# Patient Record
Sex: MALE | Race: WHITE | HISPANIC OR LATINO | URBAN - METROPOLITAN AREA
[De-identification: names, ages, dates, MRNs, and addresses within clinical notes are randomized per-mention and may not be internally consistent; named-entity substitution may affect disease eponyms.]

---

## 2017-01-06 ENCOUNTER — EMERGENCY (EMERGENCY)
Facility: HOSPITAL | Age: 42
LOS: 1 days | Discharge: HOME | End: 2017-01-06
Attending: EMERGENCY MEDICINE | Admitting: EMERGENCY MEDICINE
Payer: COMMERCIAL

## 2017-01-06 VITALS
WEIGHT: 199.96 LBS | SYSTOLIC BLOOD PRESSURE: 94 MMHG | OXYGEN SATURATION: 97 % | HEART RATE: 63 BPM | HEIGHT: 72 IN | TEMPERATURE: 98 F | RESPIRATION RATE: 20 BRPM | DIASTOLIC BLOOD PRESSURE: 64 MMHG
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
## 2017-01-06 DIAGNOSIS — T31.0 BURNS INVOLVING LESS THAN 10% OF BODY SURFACE: ICD-10-CM
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
## 2017-01-06 DIAGNOSIS — T22.211A BURN OF SECOND DEGREE OF RIGHT FOREARM, INITIAL ENCOUNTER: ICD-10-CM

## 2017-01-06 PROCEDURE — 99282 EMERGENCY DEPT VISIT SF MDM: CPT

## 2017-01-06 NOTE — ED STATDOCS - NS ED MD SCRIBE ATTENDING SCRIBE SECTIONS
PAST MEDICAL/SURGICAL/SOCIAL HISTORY/REVIEW OF SYSTEMS/HIV/PHYSICAL EXAM/VITAL SIGNS( Pullset)/DISPOSITION/HISTORY OF PRESENT ILLNESS

## 2017-01-06 NOTE — ED STATDOCS - OBJECTIVE STATEMENT
42 y/o male presents to ED c/o burn on the right forearm onset 4 days s/p trauma at work. 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 Pt has been putting aloe vera on the burn. Last tetanus shot was 3-4 years ago. Pt taking no medication for pain. No further complaints at this time.

## 2017-01-06 NOTE — ED STATDOCS - SKIN, MLM
1st and 3nd degree burns of right forearm. Not circumferential, not involving fingers. Some blisters ruptured; healing well. Few intact blisters; small with no evidence of infection.

## 2017-01-06 NOTE — ED STATDOCS - DETAILS:
I, Rowan Mcmillan, personally performed the services described in the documentation, reviewed the documentation recorded by the scribe in my presence and it accurately and completely records my words and action.

## 2023-11-29 ENCOUNTER — RX ONLY (RX ONLY)
Age: 48
End: 2023-11-29

## 2023-11-29 ENCOUNTER — OFFICE (OUTPATIENT)
Dept: URBAN - METROPOLITAN AREA CLINIC 116 | Facility: CLINIC | Age: 48
Setting detail: OPHTHALMOLOGY
End: 2023-11-29
Payer: COMMERCIAL

## 2023-11-29 DIAGNOSIS — H40.003: ICD-10-CM

## 2023-11-29 PROBLEM — H52.4 PRESBYOPIA: Status: ACTIVE | Noted: 2023-11-29

## 2023-11-29 PROCEDURE — 92004 COMPRE OPH EXAM NEW PT 1/>: CPT | Performed by: OPTOMETRIST

## 2023-11-29 PROCEDURE — 92250 FUNDUS PHOTOGRAPHY W/I&R: CPT | Performed by: OPTOMETRIST

## 2023-11-29 ASSESSMENT — REFRACTION_AUTOREFRACTION
OS_AXIS: 120
OS_SPHERE: +0.75
OS_CYLINDER: -0.25
OD_AXIS: 140
OD_CYLINDER: -0.25
OD_SPHERE: +0.75

## 2023-11-29 ASSESSMENT — REFRACTION_MANIFEST
OD_CYLINDER: SPH
OS_ADD: +1.50
OD_ADD: +1.50
OD_SPHERE: +0.25
OS_CYLINDER: SPH
OD_VA1: 20/20
OS_SPHERE: +0.25
OS_VA1: 20/20

## 2023-11-29 ASSESSMENT — REFRACTION_CURRENTRX
OS_ADD: +0.75
OD_CYLINDER: -0.25
OD_AXIS: 040
OD_OVR_VA: 20/
OD_SPHERE: +0.50
OD_OVR_VA: 20/
OS_OVR_VA: 20/
OD_ADD: +1.00
OS_OVR_VA: 20/
OD_SPHERE: +2.00
OS_CYLINDER: -0.25
OS_AXIS: 095
OS_SPHERE: +0.50
OS_SPHERE: +2.00

## 2023-11-29 ASSESSMENT — SPHEQUIV_DERIVED
OD_SPHEQUIV: 0.625
OS_SPHEQUIV: 0.625

## 2023-11-29 ASSESSMENT — CONFRONTATIONAL VISUAL FIELD TEST (CVF)
OS_FINDINGS: FULL
OD_FINDINGS: FULL

## 2023-12-05 ENCOUNTER — OFFICE (OUTPATIENT)
Dept: URBAN - METROPOLITAN AREA CLINIC 94 | Facility: CLINIC | Age: 48
Setting detail: OPHTHALMOLOGY
End: 2023-12-05
Payer: COMMERCIAL

## 2023-12-05 DIAGNOSIS — H40.1121: ICD-10-CM

## 2023-12-05 DIAGNOSIS — H40.1111: ICD-10-CM

## 2023-12-05 PROCEDURE — 92012 INTRM OPH EXAM EST PATIENT: CPT | Performed by: OPHTHALMOLOGY

## 2023-12-05 PROCEDURE — 92020 GONIOSCOPY: CPT | Performed by: OPHTHALMOLOGY

## 2023-12-05 PROCEDURE — 76514 ECHO EXAM OF EYE THICKNESS: CPT | Performed by: OPHTHALMOLOGY

## 2023-12-05 PROCEDURE — 92133 CPTRZD OPH DX IMG PST SGM ON: CPT | Performed by: OPHTHALMOLOGY

## 2023-12-05 PROCEDURE — 92083 EXTENDED VISUAL FIELD XM: CPT | Performed by: OPHTHALMOLOGY

## 2023-12-05 ASSESSMENT — REFRACTION_CURRENTRX
OS_ADD: +0.75
OS_OVR_VA: 20/
OD_SPHERE: +0.50
OD_OVR_VA: 20/
OD_CYLINDER: -0.25
OD_AXIS: 040
OS_CYLINDER: -0.25
OS_SPHERE: +0.50
OD_ADD: +1.00
OD_OVR_VA: 20/
OD_SPHERE: +2.00
OS_OVR_VA: 20/
OS_SPHERE: +2.00
OS_AXIS: 095

## 2023-12-05 ASSESSMENT — SPHEQUIV_DERIVED
OS_SPHEQUIV: 0.625
OD_SPHEQUIV: 0.5

## 2023-12-05 ASSESSMENT — REFRACTION_AUTOREFRACTION
OD_AXIS: 000
OS_CYLINDER: -0.25
OS_AXIS: 027
OS_SPHERE: +0.75
OD_SPHERE: +0.50
OD_CYLINDER: 0.00

## 2023-12-05 ASSESSMENT — REFRACTION_MANIFEST
OS_VA1: 20/20
OD_CYLINDER: SPH
OD_VA1: 20/20
OS_CYLINDER: SPH
OD_ADD: +1.50
OS_SPHERE: +0.25
OD_SPHERE: +0.25
OS_ADD: +1.50

## 2023-12-05 ASSESSMENT — CONFRONTATIONAL VISUAL FIELD TEST (CVF)
OS_FINDINGS: FULL
OD_FINDINGS: FULL

## 2023-12-28 ENCOUNTER — ASC (OUTPATIENT)
Dept: URBAN - METROPOLITAN AREA SURGERY 8 | Facility: SURGERY | Age: 48
Setting detail: OPHTHALMOLOGY
End: 2023-12-28
Payer: COMMERCIAL

## 2023-12-28 DIAGNOSIS — H40.1111: ICD-10-CM

## 2023-12-28 PROBLEM — H40.1121 POAG; RIGHT EYE MILD, LEFT EYE MILD: Status: ACTIVE | Noted: 2023-12-05

## 2023-12-28 PROCEDURE — 65855 TRABECULOPLASTY LASER SURG: CPT | Mod: RT | Performed by: OPHTHALMOLOGY

## 2023-12-28 ASSESSMENT — REFRACTION_CURRENTRX
OS_SPHERE: +0.50
OS_ADD: +0.75
OS_SPHERE: +2.00
OS_OVR_VA: 20/
OS_OVR_VA: 20/
OD_SPHERE: +2.00
OD_CYLINDER: -0.25
OD_OVR_VA: 20/
OD_ADD: +1.00
OS_AXIS: 095
OD_AXIS: 040
OS_CYLINDER: -0.25
OD_OVR_VA: 20/
OD_SPHERE: +0.50

## 2023-12-28 ASSESSMENT — REFRACTION_MANIFEST
OS_ADD: +1.50
OD_VA1: 20/20
OS_SPHERE: +0.25
OS_CYLINDER: SPH
OD_CYLINDER: SPH
OD_ADD: +1.50
OD_SPHERE: +0.25
OS_VA1: 20/20

## 2023-12-28 ASSESSMENT — REFRACTION_AUTOREFRACTION
OS_SPHERE: +0.75
OS_AXIS: 027
OD_AXIS: 000
OD_CYLINDER: 0.00
OS_CYLINDER: -0.25
OD_SPHERE: +0.50

## 2023-12-28 ASSESSMENT — SPHEQUIV_DERIVED
OD_SPHEQUIV: 0.5
OS_SPHEQUIV: 0.625

## 2024-02-06 ENCOUNTER — OFFICE (OUTPATIENT)
Dept: URBAN - METROPOLITAN AREA CLINIC 94 | Facility: CLINIC | Age: 49
Setting detail: OPHTHALMOLOGY
End: 2024-02-06
Payer: COMMERCIAL

## 2024-02-06 DIAGNOSIS — H40.1121: ICD-10-CM

## 2024-02-06 PROCEDURE — 65855 TRABECULOPLASTY LASER SURG: CPT | Mod: LT | Performed by: OPHTHALMOLOGY

## 2024-02-06 ASSESSMENT — REFRACTION_MANIFEST
OD_SPHERE: +0.25
OS_VA1: 20/20
OS_ADD: +1.50
OD_ADD: +1.50
OS_SPHERE: +0.25
OS_CYLINDER: SPH
OD_CYLINDER: SPH
OD_VA1: 20/20

## 2024-02-06 ASSESSMENT — REFRACTION_CURRENTRX
OD_ADD: +1.00
OD_CYLINDER: -0.25
OS_CYLINDER: -0.25
OS_OVR_VA: 20/
OD_AXIS: 040
OS_AXIS: 095
OD_OVR_VA: 20/
OS_SPHERE: +2.00
OS_ADD: +0.75
OD_OVR_VA: 20/
OS_SPHERE: +0.50
OD_SPHERE: +2.00
OD_SPHERE: +0.50
OS_OVR_VA: 20/

## 2024-02-06 ASSESSMENT — REFRACTION_AUTOREFRACTION
OS_SPHERE: +0.75
OS_CYLINDER: -0.25
OS_AXIS: 027
OD_SPHERE: +0.50
OD_AXIS: 000
OD_CYLINDER: 0.00

## 2024-02-06 ASSESSMENT — SPHEQUIV_DERIVED
OD_SPHEQUIV: 0.5
OS_SPHEQUIV: 0.625

## 2024-02-28 ENCOUNTER — OFFICE (OUTPATIENT)
Dept: URBAN - METROPOLITAN AREA CLINIC 94 | Facility: CLINIC | Age: 49
Setting detail: OPHTHALMOLOGY
End: 2024-02-28
Payer: COMMERCIAL

## 2024-02-28 DIAGNOSIS — H40.1121: ICD-10-CM

## 2024-02-28 DIAGNOSIS — H40.1111: ICD-10-CM

## 2024-02-28 PROCEDURE — 92012 INTRM OPH EXAM EST PATIENT: CPT | Performed by: OPHTHALMOLOGY

## 2024-02-28 ASSESSMENT — REFRACTION_CURRENTRX
OS_CYLINDER: -0.25
OS_SPHERE: +0.50
OS_AXIS: 095
OS_ADD: +0.75
OD_SPHERE: +0.50
OS_OVR_VA: 20/
OD_AXIS: 040
OD_OVR_VA: 20/
OS_OVR_VA: 20/
OD_SPHERE: +2.00
OD_OVR_VA: 20/
OS_SPHERE: +2.00
OD_CYLINDER: -0.25
OD_ADD: +1.00

## 2024-02-28 ASSESSMENT — REFRACTION_AUTOREFRACTION
OS_SPHERE: +0.75
OD_CYLINDER: 0.00
OS_CYLINDER: -0.25
OD_AXIS: 000
OD_SPHERE: +0.50
OS_AXIS: 142

## 2024-02-28 ASSESSMENT — REFRACTION_MANIFEST
OS_CYLINDER: SPH
OS_ADD: +1.50
OS_VA1: 20/20
OD_VA1: 20/20
OD_ADD: +1.50
OD_CYLINDER: SPH
OD_SPHERE: +0.25
OS_SPHERE: +0.25

## 2024-02-28 ASSESSMENT — CONFRONTATIONAL VISUAL FIELD TEST (CVF)
OS_FINDINGS: FULL
OD_FINDINGS: FULL

## 2024-02-28 ASSESSMENT — SPHEQUIV_DERIVED
OD_SPHEQUIV: 0.5
OS_SPHEQUIV: 0.625

## 2024-03-28 ENCOUNTER — OFFICE (OUTPATIENT)
Dept: URBAN - METROPOLITAN AREA CLINIC 94 | Facility: CLINIC | Age: 49
Setting detail: OPHTHALMOLOGY
End: 2024-03-28
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
## 2024-03-28 PROCEDURE — NO SHOW FE NO SHOW FEE: Performed by: OPHTHALMOLOGY

## 2024-05-15 ENCOUNTER — OFFICE (OUTPATIENT)
Dept: URBAN - METROPOLITAN AREA CLINIC 94 | Facility: CLINIC | Age: 49
Setting detail: OPHTHALMOLOGY
End: 2024-05-15
Payer: COMMERCIAL

## 2024-05-15 DIAGNOSIS — H40.1121: ICD-10-CM

## 2024-05-15 DIAGNOSIS — H40.1111: ICD-10-CM

## 2024-05-15 PROCEDURE — 92012 INTRM OPH EXAM EST PATIENT: CPT | Performed by: OPHTHALMOLOGY

## 2024-05-15 ASSESSMENT — CONFRONTATIONAL VISUAL FIELD TEST (CVF)
OS_FINDINGS: FULL
OD_FINDINGS: FULL

## 2024-09-03 ENCOUNTER — OFFICE (OUTPATIENT)
Dept: URBAN - METROPOLITAN AREA CLINIC 94 | Facility: CLINIC | Age: 49
Setting detail: OPHTHALMOLOGY
End: 2024-09-03
Payer: COMMERCIAL

## 2024-09-03 DIAGNOSIS — H40.1121: ICD-10-CM

## 2024-09-03 DIAGNOSIS — H40.1111: ICD-10-CM

## 2024-09-03 PROCEDURE — 92250 FUNDUS PHOTOGRAPHY W/I&R: CPT | Performed by: OPHTHALMOLOGY

## 2024-09-03 PROCEDURE — 92014 COMPRE OPH EXAM EST PT 1/>: CPT | Performed by: OPHTHALMOLOGY

## 2024-09-03 PROCEDURE — 92083 EXTENDED VISUAL FIELD XM: CPT | Performed by: OPHTHALMOLOGY

## 2024-09-03 ASSESSMENT — CONFRONTATIONAL VISUAL FIELD TEST (CVF)
OS_FINDINGS: FULL
OD_FINDINGS: FULL

## 2025-01-07 ENCOUNTER — OFFICE (OUTPATIENT)
Dept: URBAN - METROPOLITAN AREA CLINIC 94 | Facility: CLINIC | Age: 50
Setting detail: OPHTHALMOLOGY
End: 2025-01-07
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
## 2025-01-07 PROCEDURE — NO SHOW FE NO SHOW FEE: Performed by: OPTOMETRIST

## 2025-06-17 ENCOUNTER — OFFICE (OUTPATIENT)
Dept: URBAN - METROPOLITAN AREA CLINIC 112 | Facility: CLINIC | Age: 50
Setting detail: OPHTHALMOLOGY
End: 2025-06-17
Payer: COMMERCIAL

## 2025-06-17 DIAGNOSIS — H40.1131: ICD-10-CM

## 2025-06-17 PROCEDURE — 99213 OFFICE O/P EST LOW 20 MIN: CPT | Performed by: OPHTHALMOLOGY

## 2025-06-17 PROCEDURE — 92133 CPTRZD OPH DX IMG PST SGM ON: CPT | Performed by: OPHTHALMOLOGY

## 2025-06-17 ASSESSMENT — REFRACTION_CURRENTRX
OS_ADD: +0.75
OD_AXIS: 040
OS_SPHERE: +2.00
OS_OVR_VA: 20/
OS_SPHERE: +0.50
OS_OVR_VA: 20/
OD_SPHERE: +2.00
OD_OVR_VA: 20/
OD_SPHERE: +0.50
OD_CYLINDER: -0.25
OD_ADD: +1.00
OS_AXIS: 095
OS_CYLINDER: -0.25
OD_OVR_VA: 20/

## 2025-06-17 ASSESSMENT — REFRACTION_MANIFEST
OS_ADD: +1.50
OS_CYLINDER: SPH
OS_VA1: 20/20
OD_SPHERE: +0.25
OD_ADD: +1.50
OD_VA1: 20/20
OS_SPHERE: +0.25
OD_CYLINDER: SPH

## 2025-06-17 ASSESSMENT — KERATOMETRY
OD_K1POWER_DIOPTERS: 43.75
OS_K1POWER_DIOPTERS: 43.50
OD_AXISANGLE_DEGREES: 090
OS_AXISANGLE_DEGREES: 094
OS_K2POWER_DIOPTERS: 44.50
OD_K2POWER_DIOPTERS: 44.50

## 2025-06-17 ASSESSMENT — CONFRONTATIONAL VISUAL FIELD TEST (CVF)
OD_FINDINGS: FULL
OS_FINDINGS: FULL

## 2025-06-17 ASSESSMENT — PACHYMETRY
OD_CT_UM: 608
OS_CT_UM: 621
OD_CT_CORRECTION: -4
OS_CT_CORRECTION: -6

## 2025-06-17 ASSESSMENT — VISUAL ACUITY
OS_BCVA: 20/30-1
OD_BCVA: 20/20

## 2025-06-17 ASSESSMENT — REFRACTION_AUTOREFRACTION
OS_AXIS: 089
OS_CYLINDER: -0.25
OD_CYLINDER: -0.25
OD_AXIS: 141
OD_SPHERE: +1.25
OS_SPHERE: +1.25

## 2025-06-17 ASSESSMENT — TONOMETRY
OS_IOP_MMHG: 20
OD_IOP_MMHG: 20